# Patient Record
Sex: MALE | URBAN - METROPOLITAN AREA
[De-identification: names, ages, dates, MRNs, and addresses within clinical notes are randomized per-mention and may not be internally consistent; named-entity substitution may affect disease eponyms.]

---

## 2022-11-18 ENCOUNTER — OFFICE (OUTPATIENT)
Dept: URBAN - METROPOLITAN AREA CLINIC 121 | Facility: CLINIC | Age: 77
Setting detail: OPHTHALMOLOGY
End: 2022-11-18
Payer: MEDICARE

## 2022-11-18 DIAGNOSIS — H16.223: ICD-10-CM

## 2022-11-18 DIAGNOSIS — H35.3123: ICD-10-CM

## 2022-11-18 DIAGNOSIS — H35.033: ICD-10-CM

## 2022-11-18 DIAGNOSIS — H40.033: ICD-10-CM

## 2022-11-18 DIAGNOSIS — H26.493: ICD-10-CM

## 2022-11-18 DIAGNOSIS — E11.3312: ICD-10-CM

## 2022-11-18 DIAGNOSIS — E11.3291: ICD-10-CM

## 2022-11-18 DIAGNOSIS — H35.363: ICD-10-CM

## 2022-11-18 DIAGNOSIS — Z96.1: ICD-10-CM

## 2022-11-18 DIAGNOSIS — H35.3111: ICD-10-CM

## 2022-11-18 PROCEDURE — 99024 POSTOP FOLLOW-UP VISIT: CPT | Performed by: OPHTHALMOLOGY

## 2022-11-18 ASSESSMENT — REFRACTION_AUTOREFRACTION
OD_CYLINDER: +0.50
OS_SPHERE: +0.25
OD_AXIS: 176
OS_CYLINDER: +1.00
OD_SPHERE: -1.00
OS_AXIS: 149

## 2022-11-18 ASSESSMENT — SPHEQUIV_DERIVED
OD_SPHEQUIV: -0.75
OS_SPHEQUIV: 0.75
OD_SPHEQUIV: 1.625
OS_SPHEQUIV: 2.25

## 2022-11-18 ASSESSMENT — KERATOMETRY
OS_K1POWER_DIOPTERS: 43.00
OD_K1POWER_DIOPTERS: 43.00
OS_AXISANGLE_DEGREES: 105
OD_K2POWER_DIOPTERS: 43.50
METHOD_AUTO_MANUAL: AUTO
OS_K2POWER_DIOPTERS: 44.00
OD_AXISANGLE_DEGREES: 159

## 2022-11-18 ASSESSMENT — VISUAL ACUITY
OS_BCVA: 20/30
OD_BCVA: 20/20

## 2022-11-18 ASSESSMENT — SUPERFICIAL PUNCTATE KERATITIS (SPK)
OS_SPK: 2+
OD_SPK: 1+

## 2022-11-18 ASSESSMENT — TONOMETRY
OS_IOP_MMHG: 15
OD_IOP_MMHG: 14

## 2022-11-18 ASSESSMENT — PACHYMETRY
OD_CT_CORRECTION: -5
OD_CT_UM: 610
OS_CT_CORRECTION: -4
OS_CT_UM: 604

## 2022-11-18 ASSESSMENT — CONFRONTATIONAL VISUAL FIELD TEST (CVF)
OS_FINDINGS: FULL
OD_FINDINGS: FULL

## 2022-11-18 ASSESSMENT — REFRACTION_MANIFEST
OD_CYLINDER: +0.25
OS_AXIS: 150
OS_CYLINDER: +0.50
OS_SPHERE: +2.00
OD_SPHERE: +1.50
OD_AXIS: 180

## 2022-11-18 ASSESSMENT — AXIALLENGTH_DERIVED
OS_AL: 22.7467
OS_AL: 23.3033
OD_AL: 23.0628
OD_AL: 23.9823

## 2023-03-03 ENCOUNTER — OFFICE (OUTPATIENT)
Dept: URBAN - METROPOLITAN AREA CLINIC 121 | Facility: CLINIC | Age: 78
Setting detail: OPHTHALMOLOGY
End: 2023-03-03
Payer: MEDICARE

## 2023-03-03 DIAGNOSIS — H35.3111: ICD-10-CM

## 2023-03-03 DIAGNOSIS — H35.363: ICD-10-CM

## 2023-03-03 DIAGNOSIS — H16.223: ICD-10-CM

## 2023-03-03 DIAGNOSIS — H35.3123: ICD-10-CM

## 2023-03-03 DIAGNOSIS — Z96.1: ICD-10-CM

## 2023-03-03 DIAGNOSIS — H26.493: ICD-10-CM

## 2023-03-03 DIAGNOSIS — E11.3291: ICD-10-CM

## 2023-03-03 DIAGNOSIS — H35.033: ICD-10-CM

## 2023-03-03 DIAGNOSIS — E11.3312: ICD-10-CM

## 2023-03-03 DIAGNOSIS — H40.033: ICD-10-CM

## 2023-03-03 PROCEDURE — 92134 CPTRZ OPH DX IMG PST SGM RTA: CPT | Performed by: OPHTHALMOLOGY

## 2023-03-03 PROCEDURE — 99213 OFFICE O/P EST LOW 20 MIN: CPT | Performed by: OPHTHALMOLOGY

## 2023-03-03 ASSESSMENT — REFRACTION_AUTOREFRACTION
OS_AXIS: 149
OS_CYLINDER: +1.00
OS_SPHERE: +0.25
OD_SPHERE: -1.00
OD_CYLINDER: +0.50
OD_AXIS: 176

## 2023-03-03 ASSESSMENT — REFRACTION_MANIFEST
OD_AXIS: 180
OS_CYLINDER: +0.50
OS_AXIS: 150
OS_SPHERE: +2.00
OD_CYLINDER: +0.25
OD_SPHERE: +1.50

## 2023-03-03 ASSESSMENT — KERATOMETRY
OD_K2POWER_DIOPTERS: 43.50
OS_AXISANGLE_DEGREES: 105
OD_AXISANGLE_DEGREES: 159
OD_K1POWER_DIOPTERS: 43.00
METHOD_AUTO_MANUAL: AUTO
OS_K2POWER_DIOPTERS: 44.00
OS_K1POWER_DIOPTERS: 43.00

## 2023-03-03 ASSESSMENT — CONFRONTATIONAL VISUAL FIELD TEST (CVF)
OD_FINDINGS: FULL
OS_FINDINGS: FULL

## 2023-03-03 ASSESSMENT — SUPERFICIAL PUNCTATE KERATITIS (SPK)
OD_SPK: 1+
OS_SPK: 2+

## 2023-03-03 ASSESSMENT — AXIALLENGTH_DERIVED
OD_AL: 23.9823
OD_AL: 23.0628
OS_AL: 22.7467
OS_AL: 23.3033

## 2023-03-03 ASSESSMENT — SPHEQUIV_DERIVED
OD_SPHEQUIV: 1.625
OD_SPHEQUIV: -0.75
OS_SPHEQUIV: 0.75
OS_SPHEQUIV: 2.25

## 2023-03-03 ASSESSMENT — VISUAL ACUITY
OD_BCVA: 20/40
OS_BCVA: 20/50

## 2023-04-13 ENCOUNTER — OFFICE (OUTPATIENT)
Dept: URBAN - METROPOLITAN AREA CLINIC 121 | Facility: CLINIC | Age: 78
Setting detail: OPHTHALMOLOGY
End: 2023-04-13
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
## 2023-04-13 PROCEDURE — NO SHOW FE NO SHOW FEE: Performed by: OPHTHALMOLOGY

## 2023-04-20 ENCOUNTER — OFFICE (OUTPATIENT)
Dept: URBAN - METROPOLITAN AREA CLINIC 121 | Facility: CLINIC | Age: 78
Setting detail: OPHTHALMOLOGY
End: 2023-04-20
Payer: MEDICARE

## 2023-04-20 DIAGNOSIS — Z96.1: ICD-10-CM

## 2023-04-20 DIAGNOSIS — H35.3123: ICD-10-CM

## 2023-04-20 DIAGNOSIS — E11.3291: ICD-10-CM

## 2023-04-20 DIAGNOSIS — H16.223: ICD-10-CM

## 2023-04-20 DIAGNOSIS — H26.493: ICD-10-CM

## 2023-04-20 DIAGNOSIS — H35.3111: ICD-10-CM

## 2023-04-20 DIAGNOSIS — E11.3312: ICD-10-CM

## 2023-04-20 DIAGNOSIS — H35.033: ICD-10-CM

## 2023-04-20 DIAGNOSIS — H40.033: ICD-10-CM

## 2023-04-20 DIAGNOSIS — H35.363: ICD-10-CM

## 2023-04-20 PROCEDURE — 92134 CPTRZ OPH DX IMG PST SGM RTA: CPT | Performed by: OPHTHALMOLOGY

## 2023-04-20 PROCEDURE — 67028 INJECTION EYE DRUG: CPT | Performed by: OPHTHALMOLOGY

## 2023-04-20 PROCEDURE — 92014 COMPRE OPH EXAM EST PT 1/>: CPT | Performed by: OPHTHALMOLOGY

## 2023-04-20 ASSESSMENT — REFRACTION_MANIFEST
OD_SPHERE: +1.50
OS_SPHERE: +2.00
OS_CYLINDER: +0.50
OS_AXIS: 150
OD_CYLINDER: +0.25
OD_AXIS: 180

## 2023-04-20 ASSESSMENT — KERATOMETRY
OD_K2POWER_DIOPTERS: 43.50
OD_AXISANGLE_DEGREES: 159
OS_K1POWER_DIOPTERS: 43.00
OS_AXISANGLE_DEGREES: 105
OD_K1POWER_DIOPTERS: 43.00
OS_K2POWER_DIOPTERS: 44.00
METHOD_AUTO_MANUAL: AUTO

## 2023-04-20 ASSESSMENT — REFRACTION_AUTOREFRACTION
OD_AXIS: 176
OS_AXIS: 149
OD_SPHERE: -1.00
OS_SPHERE: +0.25
OS_CYLINDER: +1.00
OD_CYLINDER: +0.50

## 2023-04-20 ASSESSMENT — SUPERFICIAL PUNCTATE KERATITIS (SPK)
OS_SPK: 2+
OD_SPK: 1+

## 2023-04-20 ASSESSMENT — TONOMETRY
OD_IOP_MMHG: 15
OS_IOP_MMHG: 15

## 2023-04-20 ASSESSMENT — SPHEQUIV_DERIVED
OS_SPHEQUIV: 2.25
OD_SPHEQUIV: 1.625
OD_SPHEQUIV: -0.75
OS_SPHEQUIV: 0.75

## 2023-04-20 ASSESSMENT — PACHYMETRY
OD_CT_UM: 610
OS_CT_UM: 604
OS_CT_CORRECTION: -4
OD_CT_CORRECTION: -5

## 2023-04-20 ASSESSMENT — AXIALLENGTH_DERIVED
OD_AL: 23.0628
OS_AL: 22.7467
OD_AL: 23.9823
OS_AL: 23.3033

## 2023-04-20 ASSESSMENT — CONFRONTATIONAL VISUAL FIELD TEST (CVF)
OS_FINDINGS: FULL
OD_FINDINGS: FULL

## 2023-04-20 ASSESSMENT — VISUAL ACUITY
OS_BCVA: 20/40
OD_BCVA: 20/40

## 2023-05-25 ENCOUNTER — OFFICE (OUTPATIENT)
Dept: URBAN - METROPOLITAN AREA CLINIC 121 | Facility: CLINIC | Age: 78
Setting detail: OPHTHALMOLOGY
End: 2023-05-25
Payer: MEDICARE

## 2023-05-25 DIAGNOSIS — E11.3312: ICD-10-CM

## 2023-05-25 PROCEDURE — 67028 INJECTION EYE DRUG: CPT | Performed by: OPHTHALMOLOGY

## 2023-05-25 PROCEDURE — 92134 CPTRZ OPH DX IMG PST SGM RTA: CPT | Performed by: OPHTHALMOLOGY

## 2023-05-25 ASSESSMENT — REFRACTION_AUTOREFRACTION
OD_SPHERE: -1.00
OD_AXIS: 176
OS_CYLINDER: +1.00
OD_CYLINDER: +0.50
OS_SPHERE: +0.25
OS_AXIS: 149

## 2023-05-25 ASSESSMENT — PACHYMETRY
OD_CT_UM: 610
OS_CT_UM: 604
OD_CT_CORRECTION: -5
OS_CT_CORRECTION: -4

## 2023-05-25 ASSESSMENT — VISUAL ACUITY
OS_BCVA: 20/40
OD_BCVA: 20/40

## 2023-05-25 ASSESSMENT — REFRACTION_MANIFEST
OS_AXIS: 150
OD_AXIS: 180
OS_CYLINDER: +0.50
OS_SPHERE: +2.00
OD_CYLINDER: +0.25
OD_SPHERE: +1.50

## 2023-05-25 ASSESSMENT — TONOMETRY
OS_IOP_MMHG: 12
OD_IOP_MMHG: 12

## 2023-05-25 ASSESSMENT — SPHEQUIV_DERIVED
OS_SPHEQUIV: 0.75
OD_SPHEQUIV: -0.75
OD_SPHEQUIV: 1.625
OS_SPHEQUIV: 2.25

## 2023-05-25 ASSESSMENT — SUPERFICIAL PUNCTATE KERATITIS (SPK)
OS_SPK: 2+
OD_SPK: 1+

## 2023-05-25 ASSESSMENT — KERATOMETRY
METHOD_AUTO_MANUAL: AUTO
OS_K2POWER_DIOPTERS: 44.00
OD_K1POWER_DIOPTERS: 43.00
OS_K1POWER_DIOPTERS: 43.00
OD_K2POWER_DIOPTERS: 43.50
OS_AXISANGLE_DEGREES: 105
OD_AXISANGLE_DEGREES: 159

## 2023-05-25 ASSESSMENT — AXIALLENGTH_DERIVED
OS_AL: 23.3033
OS_AL: 22.7467
OD_AL: 23.0628
OD_AL: 23.9823

## 2023-05-25 ASSESSMENT — CONFRONTATIONAL VISUAL FIELD TEST (CVF)
OS_FINDINGS: FULL
OD_FINDINGS: FULL

## 2023-07-24 ENCOUNTER — OFFICE (OUTPATIENT)
Dept: URBAN - METROPOLITAN AREA CLINIC 121 | Facility: CLINIC | Age: 78
Setting detail: OPHTHALMOLOGY
End: 2023-07-24
Payer: MEDICARE

## 2023-07-24 DIAGNOSIS — H35.363: ICD-10-CM

## 2023-07-24 DIAGNOSIS — E11.3312: ICD-10-CM

## 2023-07-24 DIAGNOSIS — H40.033: ICD-10-CM

## 2023-07-24 DIAGNOSIS — H26.493: ICD-10-CM

## 2023-07-24 DIAGNOSIS — Z96.1: ICD-10-CM

## 2023-07-24 DIAGNOSIS — H35.3111: ICD-10-CM

## 2023-07-24 DIAGNOSIS — H16.223: ICD-10-CM

## 2023-07-24 DIAGNOSIS — E11.3291: ICD-10-CM

## 2023-07-24 DIAGNOSIS — H35.3123: ICD-10-CM

## 2023-07-24 DIAGNOSIS — H35.033: ICD-10-CM

## 2023-07-24 PROCEDURE — 92134 CPTRZ OPH DX IMG PST SGM RTA: CPT | Performed by: OPHTHALMOLOGY

## 2023-07-24 PROCEDURE — 99213 OFFICE O/P EST LOW 20 MIN: CPT | Performed by: OPHTHALMOLOGY

## 2023-07-24 ASSESSMENT — REFRACTION_MANIFEST
OS_CYLINDER: +0.50
OD_SPHERE: +1.50
OS_AXIS: 150
OD_CYLINDER: +0.25
OS_SPHERE: +2.00
OD_AXIS: 180

## 2023-07-24 ASSESSMENT — REFRACTION_AUTOREFRACTION
OD_AXIS: 176
OD_CYLINDER: +0.50
OS_CYLINDER: +1.00
OS_SPHERE: +0.25
OD_SPHERE: -1.00
OS_AXIS: 149

## 2023-07-24 ASSESSMENT — KERATOMETRY
OD_AXISANGLE_DEGREES: 159
OS_AXISANGLE_DEGREES: 105
METHOD_AUTO_MANUAL: AUTO
OD_K2POWER_DIOPTERS: 43.50
OS_K1POWER_DIOPTERS: 43.00
OS_K2POWER_DIOPTERS: 44.00
OD_K1POWER_DIOPTERS: 43.00

## 2023-07-24 ASSESSMENT — AXIALLENGTH_DERIVED
OD_AL: 23.9823
OS_AL: 23.3033
OD_AL: 23.0628
OS_AL: 22.7467

## 2023-07-24 ASSESSMENT — PACHYMETRY
OD_CT_CORRECTION: -5
OD_CT_UM: 610
OS_CT_CORRECTION: -4
OS_CT_UM: 604

## 2023-07-24 ASSESSMENT — VISUAL ACUITY
OD_BCVA: 20/25
OS_BCVA: 20/30

## 2023-07-24 ASSESSMENT — CONFRONTATIONAL VISUAL FIELD TEST (CVF)
OS_FINDINGS: FULL
OD_FINDINGS: FULL

## 2023-07-24 ASSESSMENT — SUPERFICIAL PUNCTATE KERATITIS (SPK)
OD_SPK: 1+
OS_SPK: 2+

## 2023-07-24 ASSESSMENT — TONOMETRY
OD_IOP_MMHG: 14
OS_IOP_MMHG: 14

## 2023-07-24 ASSESSMENT — SPHEQUIV_DERIVED
OD_SPHEQUIV: -0.75
OD_SPHEQUIV: 1.625
OS_SPHEQUIV: 2.25
OS_SPHEQUIV: 0.75

## 2023-10-27 ENCOUNTER — OFFICE (OUTPATIENT)
Dept: URBAN - METROPOLITAN AREA CLINIC 121 | Facility: CLINIC | Age: 78
Setting detail: OPHTHALMOLOGY
End: 2023-10-27
Payer: MEDICARE

## 2023-10-27 DIAGNOSIS — H35.3111: ICD-10-CM

## 2023-10-27 DIAGNOSIS — Z96.1: ICD-10-CM

## 2023-10-27 DIAGNOSIS — H26.493: ICD-10-CM

## 2023-10-27 DIAGNOSIS — H35.363: ICD-10-CM

## 2023-10-27 DIAGNOSIS — H40.033: ICD-10-CM

## 2023-10-27 DIAGNOSIS — H35.3123: ICD-10-CM

## 2023-10-27 DIAGNOSIS — H16.223: ICD-10-CM

## 2023-10-27 DIAGNOSIS — H35.033: ICD-10-CM

## 2023-10-27 DIAGNOSIS — E11.3312: ICD-10-CM

## 2023-10-27 DIAGNOSIS — E11.3291: ICD-10-CM

## 2023-10-27 PROCEDURE — 99213 OFFICE O/P EST LOW 20 MIN: CPT | Performed by: OPHTHALMOLOGY

## 2023-10-27 PROCEDURE — 92134 CPTRZ OPH DX IMG PST SGM RTA: CPT | Performed by: OPHTHALMOLOGY

## 2023-10-27 ASSESSMENT — KERATOMETRY
OS_K1POWER_DIOPTERS: 43.00
OD_K2POWER_DIOPTERS: 43.50
OD_AXISANGLE_DEGREES: 159
OD_K1POWER_DIOPTERS: 43.00
METHOD_AUTO_MANUAL: AUTO
OS_AXISANGLE_DEGREES: 105
OS_K2POWER_DIOPTERS: 44.00

## 2023-10-27 ASSESSMENT — SPHEQUIV_DERIVED
OD_SPHEQUIV: 1.625
OS_SPHEQUIV: 0.75
OS_SPHEQUIV: 2.25
OD_SPHEQUIV: -0.75

## 2023-10-27 ASSESSMENT — AXIALLENGTH_DERIVED
OS_AL: 23.3033
OD_AL: 23.0628
OD_AL: 23.9823
OS_AL: 22.7467

## 2023-10-27 ASSESSMENT — REFRACTION_MANIFEST
OD_CYLINDER: +0.25
OS_AXIS: 150
OD_SPHERE: +1.50
OD_AXIS: 180
OS_CYLINDER: +0.50
OS_SPHERE: +2.00

## 2023-10-27 ASSESSMENT — VISUAL ACUITY
OD_BCVA: 20/30
OS_BCVA: 20/40

## 2023-10-27 ASSESSMENT — SUPERFICIAL PUNCTATE KERATITIS (SPK)
OD_SPK: 1+
OS_SPK: 2+

## 2023-10-27 ASSESSMENT — CONFRONTATIONAL VISUAL FIELD TEST (CVF)
OS_FINDINGS: FULL
OD_FINDINGS: FULL

## 2023-10-27 ASSESSMENT — PACHYMETRY
OD_CT_CORRECTION: -5
OD_CT_UM: 610
OS_CT_CORRECTION: -4
OS_CT_UM: 604

## 2023-10-27 ASSESSMENT — REFRACTION_CURRENTRX
OD_SPHERE: +1.50
OD_OVR_VA: 20/
OS_OVR_VA: 20/
OS_SPHERE: +1.50

## 2023-10-27 ASSESSMENT — REFRACTION_AUTOREFRACTION
OD_AXIS: 176
OD_SPHERE: -1.00
OS_AXIS: 149
OS_SPHERE: +0.25
OD_CYLINDER: +0.50
OS_CYLINDER: +1.00

## 2023-10-27 ASSESSMENT — TONOMETRY
OS_IOP_MMHG: 14
OD_IOP_MMHG: 14

## 2024-04-11 ENCOUNTER — NON-APPOINTMENT (OUTPATIENT)
Age: 79
End: 2024-04-11

## 2024-04-12 ENCOUNTER — APPOINTMENT (OUTPATIENT)
Dept: VASCULAR SURGERY | Facility: CLINIC | Age: 79
End: 2024-04-12
Payer: MEDICARE

## 2024-04-12 VITALS
DIASTOLIC BLOOD PRESSURE: 68 MMHG | BODY MASS INDEX: 26.07 KG/M2 | HEIGHT: 68 IN | HEART RATE: 83 BPM | WEIGHT: 172 LBS | SYSTOLIC BLOOD PRESSURE: 121 MMHG

## 2024-04-12 DIAGNOSIS — Z87.891 PERSONAL HISTORY OF NICOTINE DEPENDENCE: ICD-10-CM

## 2024-04-12 DIAGNOSIS — E11.9 TYPE 2 DIABETES MELLITUS W/OUT COMPLICATIONS: ICD-10-CM

## 2024-04-12 DIAGNOSIS — E78.5 HYPERLIPIDEMIA, UNSPECIFIED: ICD-10-CM

## 2024-04-12 DIAGNOSIS — I10 ESSENTIAL (PRIMARY) HYPERTENSION: ICD-10-CM

## 2024-04-12 PROBLEM — Z00.00 ENCOUNTER FOR PREVENTIVE HEALTH EXAMINATION: Status: ACTIVE | Noted: 2024-04-12

## 2024-04-12 PROCEDURE — 99203 OFFICE O/P NEW LOW 30 MIN: CPT

## 2024-04-12 RX ORDER — GABAPENTIN 100 MG/1
CAPSULE ORAL
Refills: 0 | Status: ACTIVE | COMMUNITY

## 2024-04-12 RX ORDER — EMPAGLIFLOZIN 25 MG/1
25 TABLET, FILM COATED ORAL
Refills: 0 | Status: ACTIVE | COMMUNITY

## 2024-04-12 RX ORDER — GLIMEPIRIDE 2 MG/1
2 TABLET ORAL
Refills: 0 | Status: ACTIVE | COMMUNITY

## 2024-04-12 RX ORDER — ATORVASTATIN CALCIUM 10 MG/1
10 TABLET, FILM COATED ORAL
Refills: 0 | Status: ACTIVE | COMMUNITY

## 2024-04-12 RX ORDER — LISINOPRIL AND HYDROCHLOROTHIAZIDE TABLETS 20; 12.5 MG/1; MG/1
20-12.5 TABLET ORAL
Refills: 0 | Status: ACTIVE | COMMUNITY

## 2024-04-12 NOTE — ASSESSMENT
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 He does have evidence of mild arterial disease, but he has a palpable 2+ PT pulse.  I do not believe his neuropathic symptoms are secondary to arterial insufficiency.   Will obtain a PVR to get a baseline assessment of his perfusion so that we can continue to follow. I did discuss with him the importance of cardiovascular risk factor control as well as diabetes control to prevent the onset of symptomatic arterial disease.  We discussed the importance of meticulous footcare.  He understands this. He will follow-up with me after testing performed.

## 2024-04-12 NOTE — HISTORY OF PRESENT ILLNESS
[FreeTextEntry1] : 79-year-old male here for vascular evaluation upon recommendation from his endocrinologist. He has a longstanding history of diabetes, approximately 15 years, none insulin-dependent.  His control has been sporadic, his most recent A1c was 8.5. He reports cold sensation of both feet, as well as a numbness and tingling on the plantar aspects of both feet.  This has been ongoing for quite some time. He denies symptoms of claudication.  He can ambulate for long distances without the onset of buttock, thigh, calf pain. He receives podiatric care from Dr. Casper, whom he started seeing recently. Longstanding previous smoker, quit 16 years ago

## 2024-04-12 NOTE — PHYSICAL EXAM
[Normal Breath Sounds] : Normal breath sounds [1+] : left 1+ [2+] : left 2+ [Alert] : alert [Oriented to Person] : oriented to person [Oriented to Place] : oriented to place [Calm] : calm [de-identified] : NAD [de-identified] : NCAT [de-identified] : supple [de-identified] : soft, nt, nd, no palpable masses

## 2024-04-16 ENCOUNTER — APPOINTMENT (OUTPATIENT)
Dept: VASCULAR SURGERY | Facility: CLINIC | Age: 79
End: 2024-04-16
Payer: MEDICARE

## 2024-04-16 PROCEDURE — 93923 UPR/LXTR ART STDY 3+ LVLS: CPT

## 2024-05-03 ENCOUNTER — OFFICE (OUTPATIENT)
Dept: URBAN - METROPOLITAN AREA CLINIC 121 | Facility: CLINIC | Age: 79
Setting detail: OPHTHALMOLOGY
End: 2024-05-03
Payer: MEDICARE

## 2024-05-03 DIAGNOSIS — H35.3111: ICD-10-CM

## 2024-05-03 DIAGNOSIS — H40.033: ICD-10-CM

## 2024-05-03 DIAGNOSIS — Z96.1: ICD-10-CM

## 2024-05-03 DIAGNOSIS — E11.3291: ICD-10-CM

## 2024-05-03 DIAGNOSIS — H35.363: ICD-10-CM

## 2024-05-03 DIAGNOSIS — H16.223: ICD-10-CM

## 2024-05-03 DIAGNOSIS — E11.3392: ICD-10-CM

## 2024-05-03 DIAGNOSIS — H35.033: ICD-10-CM

## 2024-05-03 DIAGNOSIS — H26.493: ICD-10-CM

## 2024-05-03 DIAGNOSIS — H35.3123: ICD-10-CM

## 2024-05-03 PROCEDURE — 92250 FUNDUS PHOTOGRAPHY W/I&R: CPT | Performed by: OPHTHALMOLOGY

## 2024-05-03 PROCEDURE — 99214 OFFICE O/P EST MOD 30 MIN: CPT | Performed by: OPHTHALMOLOGY

## 2024-05-03 ASSESSMENT — CONFRONTATIONAL VISUAL FIELD TEST (CVF)
OS_FINDINGS: FULL
OD_FINDINGS: FULL

## 2024-05-24 ENCOUNTER — APPOINTMENT (OUTPATIENT)
Dept: VASCULAR SURGERY | Facility: CLINIC | Age: 79
End: 2024-05-24
Payer: MEDICARE

## 2024-05-24 VITALS
WEIGHT: 172 LBS | HEIGHT: 68 IN | DIASTOLIC BLOOD PRESSURE: 82 MMHG | BODY MASS INDEX: 26.07 KG/M2 | HEART RATE: 83 BPM | SYSTOLIC BLOOD PRESSURE: 132 MMHG

## 2024-05-24 DIAGNOSIS — G62.9 POLYNEUROPATHY, UNSPECIFIED: ICD-10-CM

## 2024-05-24 PROCEDURE — 99214 OFFICE O/P EST MOD 30 MIN: CPT

## 2024-05-24 NOTE — PHYSICAL EXAM
[Normal Breath Sounds] : Normal breath sounds [1+] : left 1+ [2+] : left 2+ [Alert] : alert [Oriented to Person] : oriented to person [Oriented to Place] : oriented to place [Calm] : calm [de-identified] : NAD [de-identified] : NCAT [de-identified] : supple [de-identified] : soft, nt, nd, no palpable masses

## 2024-05-24 NOTE — HISTORY OF PRESENT ILLNESS
[FreeTextEntry1] : 79-year-old male here for vascular evaluation upon recommendation from his endocrinologist. He has a longstanding history of diabetes, approximately 15 years, none insulin-dependent.  His control has been sporadic, his most recent A1c was 8.5. He reports cold sensation of both feet, as well as a numbness and tingling on the plantar aspects of both feet.  This has been ongoing for quite some time. He denies symptoms of claudication.  He can ambulate for long distances without the onset of buttock, thigh, calf pain. He receives podiatric care from Dr. Casper, whom he started seeing recently. Longstanding previous smoker, quit 16 years ago [de-identified] : 5/24/24 - He is doing well without changes in symptoms since his last office visit.  No claudication symptoms.  No foot ulcers.  Underwent noninvasive physiologic arterial testing.

## 2024-05-24 NOTE — ASSESSMENT
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 He does have evidence of mild arterial disease, but he has a palpable 2+ PT pulse.  I do not believe his neuropathic symptoms are secondary to arterial insufficiency.   We did obtain a PVR to corroborate my exam findings, and this was essentially a normal study.  I discussed this with him, and reassured him that he does not have significant macrovascular arterial insufficiency. I did discuss with him the importance of cardiovascular risk factor control as well as diabetes control to prevent the onset of symptomatic arterial disease.  We discussed the importance of meticulous footcare.  He understands this. He will continue follow-up for podiatric care with his podiatrist.  If he develops any lower extremity ulcerations, or symptoms of arterial insufficiency which we discussed, he will follow-up with me.

## 2024-08-05 ENCOUNTER — NON-APPOINTMENT (OUTPATIENT)
Age: 79
End: 2024-08-05

## 2024-08-05 PROBLEM — I73.9 PERIPHERAL ARTERIAL DISEASE: Status: ACTIVE | Noted: 2024-08-05

## 2024-08-05 PROBLEM — E11.42 PERIPHERAL SENSORY NEUROPATHY DUE TO TYPE 2 DIABETES MELLITUS: Status: ACTIVE | Noted: 2024-08-05

## 2024-08-06 ENCOUNTER — NON-APPOINTMENT (OUTPATIENT)
Age: 79
End: 2024-08-06

## 2024-08-06 ENCOUNTER — APPOINTMENT (OUTPATIENT)
Dept: NEUROLOGY | Facility: CLINIC | Age: 79
End: 2024-08-06

## 2024-08-06 PROBLEM — H91.93 BILATERAL HEARING LOSS, UNSPECIFIED HEARING LOSS TYPE: Status: ACTIVE | Noted: 2024-08-06

## 2024-08-06 PROCEDURE — 99205 OFFICE O/P NEW HI 60 MIN: CPT

## 2024-08-07 NOTE — ASSESSMENT
[FreeTextEntry1] : The patient has uncontrolled diabetes.  He is improving in reference to his hemoglobin A1c.  This is likely the root cause of his neuropathic findings.  I encouraged him to maintain his diet and compliance with medication.  He is cycling every morning about 30 minutes.  He will be scheduled for electrodiagnostic studies.  I will begin him on gabapentin 300 mg to take 2 hours prior to bed.  In 1 week if he tolerates this he may increase it to 600 mg at bedtime.    Encouraged f/u with audiology. He is resistant to getting hearing aides.  He is going to be in [place] for about a month and will likely do the studies afterwards.  I encouraged his daughter to sign up for follow my health to communicate with me if there are any issues.

## 2024-08-07 NOTE — PHYSICAL EXAM
[FreeTextEntry1] : Physical examination  General: No acute distress, Awake, Alert.   Mental status  Awake, alert, and oriented to person, time and place, Normal attention span and concentration, Recent and remote memory intact, Language intact, Fund of knowledge intact.  Cranial Nerves  II: VFF  III, IV, VI: PERRL, EOMI.  V: Facial sensation is normal B/L.  VII: Facial strength is normal B/L.  VIII: Gross hearing is decreased IX, X: Palate is midline and elevates symmetrically.  XI: Trapezius normal strength.  XII: Tongue midline without atrophy or fasciculations.   Motor exam  Muscle tone - no evidence of rigidity or resistance in all 4 extremities.  No atrophy or fasciculations  Muscle Strength: arms and legs, proximal and distal flexors and extensors are normal  No UE drift.  Reflexes  All present, normal, and symmetrical.  Plantars right: mute.  Plantars left: mute.  Absent ankle jerks.   Coordination  Finger to nose: Normal.  Heel to shin: Normal.   Sensory  Impaired vibration. dec pp romberg present  Gait  wide based; unable to heal to toe.

## 2024-08-07 NOTE — ASSESSMENT
[FreeTextEntry1] : The patient has uncontrolled diabetes.  He is improving in reference to his hemoglobin A1c.  This is likely the root cause of his neuropathic findings.  I encouraged him to maintain his diet and compliance with medication.  He is cycling every morning about 30 minutes.  He will be scheduled for electrodiagnostic studies.  I will begin him on gabapentin 300 mg to take 2 hours prior to bed.  In 1 week if he tolerates this he may increase it to 600 mg at bedtime.    Encouraged f/u with audiology. He is resistant to getting hearing aides.  He is going to be in [place] for about a month and will likely do the studies afterwards.  I encouraged his daughter to sign up for follow my health to communicate with me if there are any issues.

## 2024-08-07 NOTE — HISTORY OF PRESENT ILLNESS
[FreeTextEntry1] : This is a 79-year-old right-handed man who is being seen in neurologic consultation for evaluation of pain in the bottom of his feet.  His daughter accompanies him to this visit.  He lives alone at home.   Patient notes the pain has been present for the last 3 years.  He describes a deep burning and a tingle.  He notes that his feet get very cold.  It often keeps him up at night.  He cannot get comfortable.  His balance is off.  He has not had any falls.  He does not notice any particular weakness in his extremities.  He has been evaluated by vascular specialist and was told that he has no vascular issues.  He has been given gabapentin 100 mg to take 1 at bedtime and has not noticed any relief.  Last A1c was 8  B12 was in the 600s.

## 2024-08-07 NOTE — CONSULT LETTER
[Dear  ___] : Dear  [unfilled], [Consult Letter:] : I had the pleasure of evaluating your patient, [unfilled]. [Please see my note below.] : Please see my note below. [FreeTextEntry3] : Sincerely,  Nasra Garcia M.D.

## 2024-10-10 ENCOUNTER — APPOINTMENT (OUTPATIENT)
Dept: NEUROLOGY | Facility: CLINIC | Age: 79
End: 2024-10-10
Payer: MEDICARE

## 2024-10-10 DIAGNOSIS — G62.9 POLYNEUROPATHY, UNSPECIFIED: ICD-10-CM

## 2024-10-10 PROCEDURE — 95911 NRV CNDJ TEST 9-10 STUDIES: CPT

## 2024-10-10 PROCEDURE — 95886 MUSC TEST DONE W/N TEST COMP: CPT

## 2024-11-04 ENCOUNTER — OFFICE (OUTPATIENT)
Facility: LOCATION | Age: 79
Setting detail: OPHTHALMOLOGY
End: 2024-11-04
Payer: MEDICARE

## 2024-11-04 DIAGNOSIS — H26.493: ICD-10-CM

## 2024-11-04 DIAGNOSIS — H35.363: ICD-10-CM

## 2024-11-04 DIAGNOSIS — Z96.1: ICD-10-CM

## 2024-11-04 DIAGNOSIS — E11.3291: ICD-10-CM

## 2024-11-04 DIAGNOSIS — H35.3111: ICD-10-CM

## 2024-11-04 DIAGNOSIS — H40.033: ICD-10-CM

## 2024-11-04 DIAGNOSIS — E11.3392: ICD-10-CM

## 2024-11-04 DIAGNOSIS — H16.223: ICD-10-CM

## 2024-11-04 DIAGNOSIS — H35.3123: ICD-10-CM

## 2024-11-04 DIAGNOSIS — H35.033: ICD-10-CM

## 2024-11-04 PROCEDURE — 92134 CPTRZ OPH DX IMG PST SGM RTA: CPT | Performed by: OPHTHALMOLOGY

## 2024-11-04 PROCEDURE — 99213 OFFICE O/P EST LOW 20 MIN: CPT | Performed by: OPHTHALMOLOGY

## 2024-11-04 ASSESSMENT — REFRACTION_MANIFEST
OD_AXIS: 180
OD_CYLINDER: +0.25
OS_AXIS: 150
OD_SPHERE: +1.50
OS_SPHERE: +2.00
OS_CYLINDER: +0.50

## 2024-11-04 ASSESSMENT — KERATOMETRY
OS_K2POWER_DIOPTERS: 44.00
OD_AXISANGLE_DEGREES: 009
OS_AXISANGLE_DEGREES: 111
OD_K1POWER_DIOPTERS: 42.75
METHOD_AUTO_MANUAL: AUTO
OD_K2POWER_DIOPTERS: 43.50
OS_K1POWER_DIOPTERS: 42.75

## 2024-11-04 ASSESSMENT — VISUAL ACUITY
OS_BCVA: 20/30
OD_BCVA: 20/30

## 2024-11-04 ASSESSMENT — REFRACTION_CURRENTRX
OD_OVR_VA: 20/
OS_OVR_VA: 20/
OS_SPHERE: +1.50
OD_SPHERE: +1.50

## 2024-11-04 ASSESSMENT — REFRACTION_AUTOREFRACTION
OD_CYLINDER: +2.75
OS_CYLINDER: +0.25
OD_SPHERE: -1.25
OS_SPHERE: 0.00
OS_AXIS: 137
OD_AXIS: 004

## 2024-11-04 ASSESSMENT — CONFRONTATIONAL VISUAL FIELD TEST (CVF)
OS_FINDINGS: FULL
OD_FINDINGS: FULL

## 2024-11-04 ASSESSMENT — SUPERFICIAL PUNCTATE KERATITIS (SPK)
OD_SPK: 1+
OS_SPK: 2+

## 2025-02-05 ENCOUNTER — APPOINTMENT (OUTPATIENT)
Dept: NEUROLOGY | Facility: CLINIC | Age: 80
End: 2025-02-05

## 2025-05-05 ENCOUNTER — OFFICE (OUTPATIENT)
Facility: LOCATION | Age: 80
Setting detail: OPHTHALMOLOGY
End: 2025-05-05
Payer: MEDICARE

## 2025-05-05 DIAGNOSIS — H26.491: ICD-10-CM

## 2025-05-05 DIAGNOSIS — E11.3392: ICD-10-CM

## 2025-05-05 DIAGNOSIS — E11.3291: ICD-10-CM

## 2025-05-05 DIAGNOSIS — H16.223: ICD-10-CM

## 2025-05-05 DIAGNOSIS — H35.3111: ICD-10-CM

## 2025-05-05 DIAGNOSIS — Z96.1: ICD-10-CM

## 2025-05-05 DIAGNOSIS — H40.033: ICD-10-CM

## 2025-05-05 DIAGNOSIS — H35.3123: ICD-10-CM

## 2025-05-05 DIAGNOSIS — H35.033: ICD-10-CM

## 2025-05-05 DIAGNOSIS — H43.392: ICD-10-CM

## 2025-05-05 DIAGNOSIS — H35.363: ICD-10-CM

## 2025-05-05 PROCEDURE — 92250 FUNDUS PHOTOGRAPHY W/I&R: CPT | Performed by: OPHTHALMOLOGY

## 2025-05-05 PROCEDURE — 99214 OFFICE O/P EST MOD 30 MIN: CPT | Performed by: OPHTHALMOLOGY

## 2025-05-05 ASSESSMENT — TONOMETRY
OS_IOP_MMHG: 15
OD_IOP_MMHG: 15

## 2025-05-05 ASSESSMENT — REFRACTION_AUTOREFRACTION
OD_AXIS: 001
OS_CYLINDER: +0.50
OD_SPHERE: -1.25
OS_AXIS: 141
OD_CYLINDER: +1.25
OS_SPHERE: +0.25

## 2025-05-05 ASSESSMENT — CONFRONTATIONAL VISUAL FIELD TEST (CVF)
OS_FINDINGS: FULL
OD_FINDINGS: FULL

## 2025-05-05 ASSESSMENT — REFRACTION_MANIFEST
OD_CYLINDER: +0.25
OS_AXIS: 150
OD_SPHERE: +1.50
OS_CYLINDER: +0.50
OS_SPHERE: +2.00
OD_AXIS: 180

## 2025-05-05 ASSESSMENT — REFRACTION_CURRENTRX
OD_OVR_VA: 20/
OS_SPHERE: +1.50
OD_OVR_VA: 20/
OS_OVR_VA: 20/
OS_SPHERE: +1.75
OD_SPHERE: +1.50
OS_OVR_VA: 20/
OD_SPHERE: +1.75

## 2025-05-05 ASSESSMENT — VISUAL ACUITY
OS_BCVA: 20/25
OD_BCVA: 20/25

## 2025-05-05 ASSESSMENT — KERATOMETRY
METHOD_AUTO_MANUAL: AUTO
OS_AXISANGLE_DEGREES: 134
OD_K2POWER_DIOPTERS: 43.75
OD_AXISANGLE_DEGREES: 006
OS_K2POWER_DIOPTERS: 43.50
OS_K1POWER_DIOPTERS: 43.00
OD_K1POWER_DIOPTERS: 42.50

## 2025-05-05 ASSESSMENT — PACHYMETRY
OS_CT_CORRECTION: -4
OD_CT_UM: 610
OS_CT_UM: 604
OD_CT_CORRECTION: -5

## 2025-05-05 ASSESSMENT — SUPERFICIAL PUNCTATE KERATITIS (SPK)
OS_SPK: T
OD_SPK: T